# Patient Record
Sex: FEMALE | URBAN - METROPOLITAN AREA
[De-identification: names, ages, dates, MRNs, and addresses within clinical notes are randomized per-mention and may not be internally consistent; named-entity substitution may affect disease eponyms.]

---

## 2017-04-13 ENCOUNTER — APPOINTMENT (OUTPATIENT)
Age: 37
Setting detail: DERMATOLOGY
End: 2017-04-14

## 2017-04-13 DIAGNOSIS — L81.0 POSTINFLAMMATORY HYPERPIGMENTATION: ICD-10-CM

## 2017-04-13 DIAGNOSIS — L70.8 OTHER ACNE: ICD-10-CM

## 2017-04-13 PROCEDURE — 99203 OFFICE O/P NEW LOW 30 MIN: CPT

## 2017-04-13 PROCEDURE — OTHER PRESCRIPTION: OTHER

## 2017-04-13 PROCEDURE — OTHER COUNSELING: OTHER

## 2017-04-13 PROCEDURE — OTHER TREATMENT REGIMEN: OTHER

## 2017-04-13 PROCEDURE — OTHER IN-HOUSE DISPENSING PHARMACY: OTHER

## 2017-04-13 RX ORDER — DAPSONE 75 MG/G
GEL TOPICAL
Qty: 1 | Refills: 3 | Status: ERX | COMMUNITY
Start: 2017-04-13

## 2017-04-13 RX ORDER — SPIRONOLACTONE 100 MG/1
TABLET, FILM COATED ORAL
Qty: 180 | Refills: 1 | Status: ERX | COMMUNITY
Start: 2017-04-13

## 2017-04-13 ASSESSMENT — LOCATION DETAILED DESCRIPTION DERM
LOCATION DETAILED: LEFT CENTRAL BUCCAL CHEEK
LOCATION DETAILED: LEFT SUPERIOR CENTRAL BUCCAL CHEEK

## 2017-04-13 ASSESSMENT — LOCATION SIMPLE DESCRIPTION DERM: LOCATION SIMPLE: LEFT CHEEK

## 2017-04-13 ASSESSMENT — LOCATION ZONE DERM: LOCATION ZONE: FACE

## 2017-04-13 NOTE — PROCEDURE: IN-HOUSE DISPENSING PHARMACY
Name Of Product 1: Tretinoin 0.025% cream Lot# [id number] Name Of Product 1: Tretinoin 0.025% cream Lot# [id number]

## 2017-04-13 NOTE — PROCEDURE: TREATMENT REGIMEN
Plan: Cetaphil lotion for dryness
Detail Level: Zone
Continue Regimen: -Aczone
Initiate Treatment: -Spironolactone 100mg 2pill QD\\n- apply a pea size amount of at bedtime TRET 0.025 - 4 RF